# Patient Record
Sex: FEMALE | Race: WHITE | Employment: FULL TIME | URBAN - METROPOLITAN AREA
[De-identification: names, ages, dates, MRNs, and addresses within clinical notes are randomized per-mention and may not be internally consistent; named-entity substitution may affect disease eponyms.]

---

## 2017-01-02 ENCOUNTER — TELEPHONE (OUTPATIENT)
Dept: ORTHOPEDICS | Facility: CLINIC | Age: 37
End: 2017-01-02

## 2017-01-02 NOTE — TELEPHONE ENCOUNTER
[facility] Call Center    Phone Message    Name of Caller: Mary Lou    Phone Number: Home number on file [phone] (home)    Best time to return call: Any    May a detailed message be left on voicemail: yes    Relation to patient: Self    Reason for Call: Other: Patient had surgery with  and states that she bumped her incision and it has been bleeding. She covered it with a bandaide and it did stop but is concerned and would like to discuss with a nurse if possible. Please call back to advise.      Action Taken: Message routed to:  Adult Clinics: Orthopedics p [id number]

## 2017-01-02 NOTE — TELEPHONE ENCOUNTER
12/22/16: s/p Right  knee arthroscopy, partial medial meniscectomy    Painting on ladder, bumped her knee on the ladder, incision opened and has been bleeding.   Pt reports that only part of incision opened. Pt cleaned and put a band-aid on it. Pt believes that it has already stopped bleeding.     Pt not wanting to come in. I advised that this was acceptable.   I advised that pt should put steri-strips on incision to keep closed and then to put gauze over incision to keep clean and dry. Change gauze as needed. Do not put any ointments or creams on incision. Keep ster-strips dry for 24 hours and as long as incision is draining.   She will notify clinic for nay signs and symptoms of infection or if incision continues to bleed.     Phil Pruitt RN

## 2019-05-24 ENCOUNTER — PRE VISIT (OUTPATIENT)
Dept: ORTHOPEDICS | Facility: CLINIC | Age: 39
End: 2019-05-24

## 2019-05-24 DIAGNOSIS — M25.552 LEFT HIP PAIN: Primary | ICD-10-CM

## 2019-05-24 NOTE — TELEPHONE ENCOUNTER
Discussed with patient reason for visit Left Hip pain  Patient prepared for appointment through the followin. Have you had any recent xrays in the last 6 months? No -  Patient informed that they will have x-rays done before appointment and to arrive at least 30 minutes before MD appointment. Xrays ordered per standing orders.    2. Have you had an MRI? No.     3. Have you had any surgery in past related to complaint?  No.  If yes, Patient advised that implant stickers are needed for any previous total joint surgery as well for appointment.     4. Are you being referred by another provider? Yes: Laly Covert, did not refer her but patient would like a note sent to her.    5. Have you received the intake form in mail?.Yes.advised to bring completed forms with patient.     6. Is this work comp or MVA related? No.  Frida Nayak RN

## 2019-05-28 ENCOUNTER — OFFICE VISIT (OUTPATIENT)
Dept: ORTHOPEDICS | Facility: CLINIC | Age: 39
End: 2019-05-28
Payer: COMMERCIAL

## 2019-05-28 ENCOUNTER — ANCILLARY PROCEDURE (OUTPATIENT)
Dept: GENERAL RADIOLOGY | Facility: CLINIC | Age: 39
End: 2019-05-28
Attending: ORTHOPAEDIC SURGERY
Payer: COMMERCIAL

## 2019-05-28 VITALS — WEIGHT: 141.8 LBS | BODY MASS INDEX: 26.09 KG/M2 | HEIGHT: 62 IN

## 2019-05-28 DIAGNOSIS — M25.552 LEFT HIP PAIN: Primary | ICD-10-CM

## 2019-05-28 DIAGNOSIS — M25.552 LEFT HIP PAIN: ICD-10-CM

## 2019-05-28 PROCEDURE — 99214 OFFICE O/P EST MOD 30 MIN: CPT | Performed by: ORTHOPAEDIC SURGERY

## 2019-05-28 PROCEDURE — 73502 X-RAY EXAM HIP UNI 2-3 VIEWS: CPT | Mod: LT | Performed by: RADIOLOGY

## 2019-05-28 RX ORDER — RIBOFLAVIN (VITAMIN B2) 100 MG
100 TABLET ORAL 3 TIMES DAILY
COMMUNITY

## 2019-05-28 ASSESSMENT — MIFFLIN-ST. JEOR: SCORE: 1271.45

## 2019-05-28 NOTE — PROGRESS NOTES
Chief Complaint: Pain of the Left Hip (Left hip issues- pt states no records to be sent NPP 2019)      Physician:  No ref. provider found    HPI: Mary Lou Adams is a 38 year old female who presents today for evaluation of her left hip     Symptom Profile  Location of symptoms:    Onset: acute, kicking a soccer ball  Trend: getting better   Duration of symptoms: 3-4 weeks   Quality of symptoms: aching, sharp/stabbing  Severity: moderate  Alleviate:activity modification   Exacerbating:  Running   Previous Treatments: Previous treatments include activity modification, oral pain medication  No PT     Current Status:  Results of the patient s Hip Disability and Osteoarthritis Outcome Score (HOOS)  are as follows (0-100 scales with 100 being the theoretical best):  Pain: 88  Symptoms:90  ADLs:97  Sports/Recreation:62.5  Quality of Life: 19  (http://koos.nu/)  [facility] Activity Score:9 runner, soccer. Continues to run but it hurts      MEDICAL HISTORY: No past medical history on file.    Medications:     Current Outpatient Medications:      Cyanocobalamin (B-12) 1000 MCG TBCR, , Disp: , Rfl:      vitamin C (ASCORBIC ACID) 100 MG tablet, Take 100 mg by mouth 3 times daily, Disp: , Rfl:      multivitamin, therapeutic with minerals (MULTI-VITAMIN) TABS tablet, Take 1 tablet by mouth daily, Disp: , Rfl:     Allergies: Codeine    SURGICAL HISTORY:   Past Surgical History:   Procedure Laterality Date     ARTHROSCOPY KNEE Right 2016    Procedure: ARTHROSCOPY KNEE;  Surgeon: Osmani Orellana MD;  Location: [facility] OR      SECTION      x 3     KNEE SURGERY Bilateral        FAMILY HISTORY: No family history on file.    SOCIAL HISTORY:   Social History     Tobacco Use     Smoking status: Never Smoker   Substance Use Topics     Alcohol use: Yes     Comment: rare       REVIEW OF SYSTEMS:  The comprehensive review of systems from the intake form was reviewed with the patient.  No fever, weight change or fatigue. No  "dry eyes. No oral ulcers, sore throat or voice change. No palpitations, syncope, angina or edema.  No chest pain, excessive sleepiness, shortness of breath or hemoptysis.   No abdominal pain, nausea, vomiting, diarrhea or heartburn.  No skin rash. No focal weakness or numbness. No bleeding or lymphadenopathy. No rhinitis or hives.     Exam:  On physical examination the patient appears the stated age, is in no acute distress, affectThe is appropriate, and breathing is non-labored.  Vitals are documented in the EMR and have been reviewed:    Ht 1.575 m (5' 2\")   Wt 64.3 kg (141 lb 12.8 oz)   BMI 25.94 kg/m    5' 2\"  Body mass index is 25.94 kg/m .    Rises from chair: easily   Gait: normal  Gains the exam table: easily     LEFT hip subjective: not irritated  Abd:   Add:  PFC:  Flexion: 100  IRF: 15  ERF: 40   Impingement test: lateral groin.   TFL ios TTP and this reproduces her lateral pain    Distally, the circulatory, motor, and sensation exam is intact with 5/5 EHL, gastroc-soleus, and tibialis anterior.  Sensation to light touch is intact.  Dorsalis pedis and posterior tibialis pulses are palpable.  There are no sores on the feet, no bruising, and no lymphedema.    X-rays:   Tonnis 1  LCE 25  Tonnis angle 7  Alpha 61    Assessment: This is a 38 year old with left hip pain associated with a cam. Symptoms for about a month. Symptoms and exam are not perfect for an intra-articular hip problem but with a fairly large cam and symptoms about the groin it is clearly in the differential.     Plan:  Physical therapy program. If does not improve after 8 weeks she is going to call, have a left hip MR arthrogram and then RTC to review.    "

## 2019-05-28 NOTE — PATIENT INSTRUCTIONS
Thanks for coming today.  Ortho[facility] Medicine Clinic  [address] [place] [address]    To schedule future appointments in Ortho Clinic, you may call [phone].    To schedule ordered imaging by your provider:   Call Central Imaging [phone]    To schedule an injection ordered by your provider:  Call Central Imaging Injection scheduling line: [phone]  [facility] available online at:  [email or web address]    Please call if any further questions or concerns ([phone]).  Clinic hours 8 am to 5 pm.    Return to clinic (call) if symptoms worsen or fail to improve.

## 2019-05-28 NOTE — NURSING NOTE
"Mary Lou Adams's chief complaint for this visit includes:  Chief Complaint   Patient presents with     Left Hip - Pain     Left hip issues- pt states no records to be sent NPP 05/21/2019     PCP: [facility]    Referring Provider:  No referring provider defined for this encounter.    Ht 1.575 m (5' 2\")   Wt 64.3 kg (141 lb 12.8 oz)   BMI 25.94 kg/m    Data Unavailable     Do you need any medication refills at today's visit? No    Tracey Ribeiro LPN      "

## 2019-05-29 ENCOUNTER — THERAPY VISIT (OUTPATIENT)
Dept: PHYSICAL THERAPY | Facility: CLINIC | Age: 39
End: 2019-05-29
Payer: COMMERCIAL

## 2019-05-29 DIAGNOSIS — M25.552 HIP PAIN, LEFT: ICD-10-CM

## 2019-05-29 PROCEDURE — 97110 THERAPEUTIC EXERCISES: CPT | Mod: GP | Performed by: PHYSICAL THERAPIST

## 2019-05-29 PROCEDURE — 97161 PT EVAL LOW COMPLEX 20 MIN: CPT | Mod: GP | Performed by: PHYSICAL THERAPIST

## 2019-05-29 ASSESSMENT — ACTIVITIES OF DAILY LIVING (ADL)
HEAVY_WORK: NO DIFFICULTY AT ALL
GETTING_INTO_AND_OUT_OF_A_BATHTUB: NO DIFFICULTY AT ALL
HOS_ADL_COUNT: 17
WALKING_UP_STEEP_HILLS: SLIGHT DIFFICULTY
HOS_ADL_HIGHEST_POTENTIAL_SCORE: 68
HOS_ADL_ITEM_SCORE_TOTAL: 65
GOING_DOWN_1_FLIGHT_OF_STAIRS: NO DIFFICULTY AT ALL
WALKING_INITIALLY: NO DIFFICULTY AT ALL
PUTTING_ON_SOCKS_AND_SHOES: NO DIFFICULTY AT ALL
DEEP_SQUATTING: NO DIFFICULTY AT ALL
GOING_UP_1_FLIGHT_OF_STAIRS: SLIGHT DIFFICULTY
RECREATIONAL_ACTIVITIES: SLIGHT DIFFICULTY
HOW_WOULD_YOU_RATE_YOUR_CURRENT_LEVEL_OF_FUNCTION_DURING_YOUR_USUAL_ACTIVITIES_OF_DAILY_LIVING_FROM_0_TO_100_WITH_100_BEING_YOUR_LEVEL_OF_FUNCTION_PRIOR_TO_YOUR_HIP_PROBLEM_AND_0_BEING_THE_INABILITY_TO_PERFORM_ANY_OF_YOUR_USUAL_DAILY_ACTIVITIES?: 75
WALKING_APPROXIMATELY_10_MINUTES: NO DIFFICULTY AT ALL
WALKING_DOWN_STEEP_HILLS: NO DIFFICULTY AT ALL
LIGHT_TO_MODERATE_WORK: NO DIFFICULTY AT ALL
ROLLING_OVER_IN_BED: NO DIFFICULTY AT ALL
TWISTING/PIVOTING_ON_INVOLVED_LEG: NO DIFFICULTY AT ALL
STEPPING_UP_AND_DOWN_CURBS: NO DIFFICULTY AT ALL
WALKING_15_MINUTES_OR_GREATER: NO DIFFICULTY AT ALL
HOS_ADL_SCORE(%): 95.59
STANDING_FOR_15_MINUTES: NO DIFFICULTY AT ALL
SITTING_FOR_15_MINUTES: NO DIFFICULTY AT ALL
GETTING_INTO_AND_OUT_OF_AN_AVERAGE_CAR: NO DIFFICULTY AT ALL

## 2019-05-29 NOTE — PROGRESS NOTES
[facility] Initial Evaluation  Subjective:  The history is provided by the patient. No  was used.   Mary Lou Adams is a 38 year old female with a left hip condition.  Condition occurred with:  Insidious onset.  Condition occurred: during recreation/sport.  This is a new condition  Was kicking a soccer ball and while she was planted on her left foot there burning sensation after she kicked.  The pain was constant for a few weeks-once pain decreased trailed running (2.5 miles on a treadmill and a few more miles outside) which caused the pain to increase..    Patient reports pain:  Lateral.  Radiates to:  No radiation.  Pain is described as sharp and is intermittent and reported as 3/10.  Associated symptoms:  Loss of strength.   Symptoms are exacerbated by running and relieved by NSAID's (as needed).  Since onset symptoms are unchanged.  Special tests:  X-ray.      General health as reported by patient is excellent.  Pertinent medical history includes:  None.  Medical allergies: yes (codeine).  Other surgeries include:  Orthopedic surgery (3 knee-bilateral).  Current medications:  None as reported by the patient.  Current occupation is [employer] program  .  Patient is working in normal job without restrictions.      Barriers include:  None as reported by the patient.    Red flags:  None as reported by the patient.                        Objective:  System                                           Hip Evaluation  Hip PROM:    Flexion: Left: 120   Right: 120    Abduction: Left: 45    Right: 45    Internal Rotation: Left: 30    Right: 40  External Rotation: Left: 60    Right: 60              Hip Strength:      Extension:  Left: 4-/5  Pain:Right: 5/5    Pain:    Abduction:  Left: 4+/5     Pain:Right: 5/5    Pain:    Internal Rotation:  Left: 4+/5    Pain:Right: 5/5   Pain:  External Rotation:  Left: 4-/5   Pain:  Right: 5/5   Pain:                             [place] Lumbar  Evaluation      Movement Loss:  Flexion (Flex): nil  Extension (EXT): nil  Side Glide R (SG R): nil  Side Glide L (SG L): min and pain                                               ROS    Assessment/Plan:    Patient is a 38 year old female with right side hip complaints.    Patient has the following significant findings with corresponding treatment plan.                Diagnosis 1:  Left Hip pain  Pain -  manual therapy, self management, education and directional preference exercise  Decreased ROM/flexibility - manual therapy and therapeutic exercise  Decreased strength - therapeutic exercise and therapeutic activities  Impaired muscle performance - neuro re-education  Decreased function - therapeutic activities    Therapy Evaluation Codes:   1) History comprised of:   Personal factors that impact the plan of care:      None.    Comorbidity factors that impact the plan of care are:      None.     Medications impacting care: None.  2) Examination of Body Systems comprised of:   Body structures and functions that impact the plan of care:      Hip.   Activity limitations that impact the plan of care are:      running.  3) Clinical presentation characteristics are:   Stable/Uncomplicated.  4) Decision-Making    Low complexity using standardized patient assessment instrument and/or measureable assessment of functional outcome.  Cumulative Therapy Evaluation is: Low complexity.    Previous and current functional limitations:  (See Goal Flow Sheet for this information)    Short term and Long term goals: (See Goal Flow Sheet for this information)     Communication ability:  Patient appears to be able to clearly communicate and understand verbal and written communication and follow directions correctly.  Treatment Explanation - The following has been discussed with the patient:   RX ordered/plan of care  Anticipated outcomes  Possible risks and side effects  This patient would benefit from PT intervention to resume normal  activities.   Rehab potential is good.    Frequency:  1 X week, once daily  Duration:  for 8 weeks  Discharge Plan:  Achieve all LTG.  Independent in home treatment program.  Reach maximal therapeutic benefit.    Please refer to the daily flowsheet for treatment today, total treatment time and time spent performing 1:1 timed codes.

## 2019-06-07 ENCOUNTER — THERAPY VISIT (OUTPATIENT)
Dept: PHYSICAL THERAPY | Facility: CLINIC | Age: 39
End: 2019-06-07
Payer: COMMERCIAL

## 2019-06-07 DIAGNOSIS — M25.552 HIP PAIN, LEFT: ICD-10-CM

## 2019-06-07 PROCEDURE — 97110 THERAPEUTIC EXERCISES: CPT | Mod: GP | Performed by: PHYSICAL THERAPY ASSISTANT

## 2019-09-26 PROBLEM — M25.552 HIP PAIN, LEFT: Status: RESOLVED | Noted: 2019-05-29 | Resolved: 2019-09-26

## 2019-09-26 NOTE — PROGRESS NOTES
Discharge Note    Progress reporting period is from initial evaluation date (please see noted date below) to Jun 7, 2019.  Linked Episodes   Type: Episode: Status: Noted: Resolved: Last update: Updated by:   PHYSICAL THERAPY Left hip pain-5/29/19 Active 5/29/2019 6/7/2019  6:59 AM Jazmin Persaud, PT      Comments:       Mary Lou failed to follow up and current status is unknown.  Please see information below for last relevant information on current status.  Patient seen for 2 visits.    SUBJECTIVE  Subjective changes noted by patient:  Pt reports hip is a little better. Less painful and gets better everyday. Did run 2 miles 1 week ago and felt pain at 1 mile irena but it didn't radiate like it had before. Did stretch at 1 mile and then continued runing 2nd milie and wasn't stabbing pain like she had previously felt. Did try to run again and felt it further into run 1.1 miles. Did run last night and did okay again. Will feel soreness for about 24 hour after.   .  Current pain level is 2/10.     Previous pain level was  3/10.   Changes in function:  Yes (See Goal flowsheet attached for changes in current functional level)  Adverse reaction to treatment or activity: None    OBJECTIVE  Changes noted in objective findings: PROM Flex 120; ER 45; Abd 40; IR 30 iwith tightness; L SG +     ASSESSMENT/PLAN  Diagnosis: L hip   Updated problem list and treatment plan:     STG/LTGs have been met or progress has been made towards goals:  Yes, please see goal flowsheet for most current information  Assessment of Progress: current status is unknown.    Last current status:     Self Management Plans:  HEP  I have re-evaluated this patient and find that the nature, scope, duration and intensity of the therapy is appropriate for the medical condition of the patient.  Mary Lou continues to require the following intervention to meet STG and LTG's:  HEP.    Recommendations:  Discharge with current home program.  Patient to follow up with  MD as needed.    Please refer to the daily flowsheet for treatment today, total treatment time and time spent performing 1:1 timed codes.

## 2020-06-01 NOTE — LETTER
2019         RE: Mary Lou Adams  [address] [place] [address]        Dear Colleague,    Thank you for referring your patient, Mary Lou Adams, to the [facility]. Please see a copy of my visit note below.    Chief Complaint: Pain of the Left Hip (Left hip issues- pt states no records to be sent NPP 2019)      Physician:  No ref. provider found    HPI: Mary Lou Adams is a 38 year old female who presents today for evaluation of her left hip     Symptom Profile  Location of symptoms:    Onset: acute, kicking a soccer ball  Trend: getting better   Duration of symptoms: 3-4 weeks   Quality of symptoms: aching, sharp/stabbing  Severity: moderate  Alleviate:activity modification   Exacerbating:  Running   Previous Treatments: Previous treatments include activity modification, oral pain medication  No PT     Current Status:  Results of the patient s Hip Disability and Osteoarthritis Outcome Score (HOOS)  are as follows (0-100 scales with 100 being the theoretical best):  Pain: 88  Symptoms:90  ADLs:97  Sports/Recreation:62.5  Quality of Life: 19  (http://koos.nu/)  [facility] Activity Score:9 runner, soccer. Continues to run but it hurts      MEDICAL HISTORY: No past medical history on file.    Medications:     Current Outpatient Medications:      Cyanocobalamin (B-12) 1000 MCG TBCR, , Disp: , Rfl:      vitamin C (ASCORBIC ACID) 100 MG tablet, Take 100 mg by mouth 3 times daily, Disp: , Rfl:      multivitamin, therapeutic with minerals (MULTI-VITAMIN) TABS tablet, Take 1 tablet by mouth daily, Disp: , Rfl:     Allergies: Codeine    SURGICAL HISTORY:   Past Surgical History:   Procedure Laterality Date     ARTHROSCOPY KNEE Right 2016    Procedure: ARTHROSCOPY KNEE;  Surgeon: Osmani Orellana MD;  Location: [facility] OR      SECTION      x 3     KNEE SURGERY Bilateral        FAMILY HISTORY: No family history on file.    SOCIAL HISTORY:   Social History     Tobacco Use  ----- Message from Gee Pyle sent at 6/1/2020  9:24 AM CDT -----  Contact: pt  Type:  Patient Returning Call    Who Called:  pt  Does the patient know what this is regarding?:  Pt stated that she had an appt Friday and would like for meds ordered to be sent. Pt stated that she is having joint pain. Please follow   [facility] - [place], [place] - [address]  [address] [address]  Phone: [phone] Fax: [phone]  Best Call Back Number:    Additional Information:  Thank cuca    Spoke to the patient. Naltrexone was faxed on 5-27-20 but not received. Assured patient will fax again. BERKLEY     "    Smoking status: Never Smoker   Substance Use Topics     Alcohol use: Yes     Comment: rare       REVIEW OF SYSTEMS:  The comprehensive review of systems from the intake form was reviewed with the patient.  No fever, weight change or fatigue. No dry eyes. No oral ulcers, sore throat or voice change. No palpitations, syncope, angina or edema.  No chest pain, excessive sleepiness, shortness of breath or hemoptysis.   No abdominal pain, nausea, vomiting, diarrhea or heartburn.  No skin rash. No focal weakness or numbness. No bleeding or lymphadenopathy. No rhinitis or hives.     Exam:  On physical examination the patient appears the stated age, is in no acute distress, affectThe is appropriate, and breathing is non-labored.  Vitals are documented in the EMR and have been reviewed:    Ht 1.575 m (5' 2\")   Wt 64.3 kg (141 lb 12.8 oz)   BMI 25.94 kg/m     5' 2\"  Body mass index is 25.94 kg/m .    Rises from chair: easily   Gait: normal  Gains the exam table: easily     LEFT hip subjective: not irritated  Abd:   Add:  PFC:  Flexion: 100  IRF: 15  ERF: 40   Impingement test: lateral groin.   TFL ios TTP and this reproduces her lateral pain    Distally, the circulatory, motor, and sensation exam is intact with 5/5 EHL, gastroc-soleus, and tibialis anterior.  Sensation to light touch is intact.  Dorsalis pedis and posterior tibialis pulses are palpable.  There are no sores on the feet, no bruising, and no lymphedema.    X-rays:   Tonnis 1  LCE 25  Tonnis angle 7  Alpha 61    Assessment: This is a 38 year old with left hip pain associated with a cam. Symptoms for about a month. Symptoms and exam are not perfect for an intra-articular hip problem but with a fairly large cam and symptoms about the groin it is clearly in the differential.     Plan:  Physical therapy program. If does not improve after 8 weeks she is going to call, have a left hip MR arthrogram and then RTC to review.      Again, thank you for allowing me to " participate in the care of your patient.        Sincerely,        Karl Malloy MD